# Patient Record
Sex: MALE | Race: WHITE | NOT HISPANIC OR LATINO | Employment: FULL TIME | URBAN - METROPOLITAN AREA
[De-identification: names, ages, dates, MRNs, and addresses within clinical notes are randomized per-mention and may not be internally consistent; named-entity substitution may affect disease eponyms.]

---

## 2019-09-28 ENCOUNTER — APPOINTMENT (EMERGENCY)
Dept: RADIOLOGY | Facility: HOSPITAL | Age: 24
End: 2019-09-28
Attending: EMERGENCY MEDICINE
Payer: COMMERCIAL

## 2019-09-28 ENCOUNTER — APPOINTMENT (EMERGENCY)
Dept: RADIOLOGY | Facility: HOSPITAL | Age: 24
End: 2019-09-28
Payer: COMMERCIAL

## 2019-09-28 ENCOUNTER — HOSPITAL ENCOUNTER (EMERGENCY)
Facility: HOSPITAL | Age: 24
Discharge: HOME | End: 2019-09-28
Attending: EMERGENCY MEDICINE | Admitting: EMERGENCY MEDICINE
Payer: COMMERCIAL

## 2019-09-28 VITALS
SYSTOLIC BLOOD PRESSURE: 143 MMHG | RESPIRATION RATE: 18 BRPM | DIASTOLIC BLOOD PRESSURE: 62 MMHG | OXYGEN SATURATION: 96 % | HEART RATE: 120 BPM | TEMPERATURE: 97.7 F | WEIGHT: 185 LBS

## 2019-09-28 DIAGNOSIS — S83.106A: Primary | ICD-10-CM

## 2019-09-28 LAB
ALBUMIN SERPL BCP-MCNC: 4.6 G/DL (ref 3.5–5)
ALP SERPL-CCNC: 74 U/L (ref 46–116)
ALT SERPL W P-5'-P-CCNC: 44 U/L (ref 12–78)
ANION GAP SERPL CALCULATED.3IONS-SCNC: 17 MMOL/L (ref 4–13)
AST SERPL W P-5'-P-CCNC: 28 U/L (ref 5–45)
BASOPHILS # BLD AUTO: 0.02 THOUSANDS/ΜL (ref 0–0.1)
BASOPHILS NFR BLD AUTO: 0 % (ref 0–1)
BILIRUB SERPL-MCNC: 0.2 MG/DL (ref 0.2–1)
BUN SERPL-MCNC: 16 MG/DL (ref 5–25)
CALCIUM SERPL-MCNC: 8.7 MG/DL (ref 8.3–10.1)
CHLORIDE SERPL-SCNC: 99 MMOL/L (ref 100–108)
CO2 SERPL-SCNC: 23 MMOL/L (ref 21–32)
CREAT SERPL-MCNC: 1.28 MG/DL (ref 0.6–1.3)
EOSINOPHIL # BLD AUTO: 0.04 THOUSAND/ΜL (ref 0–0.61)
EOSINOPHIL NFR BLD AUTO: 0 % (ref 0–6)
ERYTHROCYTE [DISTWIDTH] IN BLOOD BY AUTOMATED COUNT: 11.3 % (ref 11.6–15.1)
GFR SERPL CREATININE-BSD FRML MDRD: 79 ML/MIN/1.73SQ M
GLUCOSE SERPL-MCNC: 112 MG/DL (ref 65–140)
HCT VFR BLD AUTO: 42.1 % (ref 36.5–49.3)
HGB BLD-MCNC: 15 G/DL (ref 12–17)
IMM GRANULOCYTES # BLD AUTO: 0.03 THOUSAND/UL (ref 0–0.2)
IMM GRANULOCYTES NFR BLD AUTO: 0 % (ref 0–2)
LYMPHOCYTES # BLD AUTO: 3.25 THOUSANDS/ΜL (ref 0.6–4.47)
LYMPHOCYTES NFR BLD AUTO: 32 % (ref 14–44)
MCH RBC QN AUTO: 32.8 PG (ref 26.8–34.3)
MCHC RBC AUTO-ENTMCNC: 35.6 G/DL (ref 31.4–37.4)
MCV RBC AUTO: 92 FL (ref 82–98)
MONOCYTES # BLD AUTO: 0.7 THOUSAND/ΜL (ref 0.17–1.22)
MONOCYTES NFR BLD AUTO: 7 % (ref 4–12)
NEUTROPHILS # BLD AUTO: 6.08 THOUSANDS/ΜL (ref 1.85–7.62)
NEUTS SEG NFR BLD AUTO: 61 % (ref 43–75)
NRBC BLD AUTO-RTO: 0 /100 WBCS
PLATELET # BLD AUTO: 260 THOUSANDS/UL (ref 149–390)
PMV BLD AUTO: 8.2 FL (ref 8.9–12.7)
POTASSIUM SERPL-SCNC: 3 MMOL/L (ref 3.5–5.3)
PROT SERPL-MCNC: 8 G/DL (ref 6.4–8.2)
RBC # BLD AUTO: 4.58 MILLION/UL (ref 3.88–5.62)
SODIUM SERPL-SCNC: 139 MMOL/L (ref 136–145)
WBC # BLD AUTO: 10.12 THOUSAND/UL (ref 4.31–10.16)

## 2019-09-28 PROCEDURE — 96375 TX/PRO/DX INJ NEW DRUG ADDON: CPT

## 2019-09-28 PROCEDURE — 36415 COLL VENOUS BLD VENIPUNCTURE: CPT | Performed by: EMERGENCY MEDICINE

## 2019-09-28 PROCEDURE — 96376 TX/PRO/DX INJ SAME DRUG ADON: CPT

## 2019-09-28 PROCEDURE — 99285 EMERGENCY DEPT VISIT HI MDM: CPT

## 2019-09-28 PROCEDURE — 85025 COMPLETE CBC W/AUTO DIFF WBC: CPT | Performed by: EMERGENCY MEDICINE

## 2019-09-28 PROCEDURE — 73560 X-RAY EXAM OF KNEE 1 OR 2: CPT

## 2019-09-28 PROCEDURE — 99243 OFF/OP CNSLTJ NEW/EST LOW 30: CPT | Performed by: ORTHOPAEDIC SURGERY

## 2019-09-28 PROCEDURE — 96374 THER/PROPH/DIAG INJ IV PUSH: CPT

## 2019-09-28 PROCEDURE — 80053 COMPREHEN METABOLIC PANEL: CPT | Performed by: EMERGENCY MEDICINE

## 2019-09-28 PROCEDURE — 73706 CT ANGIO LWR EXTR W/O&W/DYE: CPT

## 2019-09-28 RX ORDER — HYDROCODONE BITARTRATE AND ACETAMINOPHEN 5; 325 MG/1; MG/1
1 TABLET ORAL ONCE
Status: COMPLETED | OUTPATIENT
Start: 2019-09-28 | End: 2019-09-28

## 2019-09-28 RX ORDER — PROPOFOL 10 MG/ML
60 INJECTION, EMULSION INTRAVENOUS ONCE
Status: COMPLETED | OUTPATIENT
Start: 2019-09-28 | End: 2019-09-28

## 2019-09-28 RX ORDER — KETAMINE HCL IN NACL, ISO-OSM 100MG/10ML
1 SYRINGE (ML) INJECTION ONCE
Status: COMPLETED | OUTPATIENT
Start: 2019-09-28 | End: 2019-09-28

## 2019-09-28 RX ORDER — HYDROCODONE BITARTRATE AND ACETAMINOPHEN 5; 325 MG/1; MG/1
1 TABLET ORAL EVERY 6 HOURS PRN
Qty: 15 TABLET | Refills: 0 | Status: SHIPPED | OUTPATIENT
Start: 2019-09-28 | End: 2019-10-01

## 2019-09-28 RX ORDER — PROPOFOL 10 MG/ML
50 INJECTION, EMULSION INTRAVENOUS ONCE
Status: DISCONTINUED | OUTPATIENT
Start: 2019-09-28 | End: 2019-09-28

## 2019-09-28 RX ORDER — FENTANYL CITRATE 50 UG/ML
100 INJECTION, SOLUTION INTRAMUSCULAR; INTRAVENOUS ONCE
Status: COMPLETED | OUTPATIENT
Start: 2019-09-28 | End: 2019-09-28

## 2019-09-28 RX ORDER — PROPOFOL 10 MG/ML
INJECTION, EMULSION INTRAVENOUS
Status: DISCONTINUED
Start: 2019-09-28 | End: 2019-09-28 | Stop reason: WASHOUT

## 2019-09-28 RX ORDER — PROPOFOL 10 MG/ML
150 INJECTION, EMULSION INTRAVENOUS ONCE
Status: COMPLETED | OUTPATIENT
Start: 2019-09-28 | End: 2019-09-28

## 2019-09-28 RX ORDER — NAPROXEN 500 MG/1
500 TABLET ORAL 2 TIMES DAILY WITH MEALS
Qty: 10 TABLET | Refills: 0 | Status: SHIPPED | OUTPATIENT
Start: 2019-09-28 | End: 2019-10-03

## 2019-09-28 RX ORDER — NAPROXEN 500 MG/1
500 TABLET ORAL ONCE
Status: COMPLETED | OUTPATIENT
Start: 2019-09-28 | End: 2019-09-28

## 2019-09-28 RX ADMIN — NAPROXEN 500 MG: 500 TABLET ORAL at 22:37

## 2019-09-28 RX ADMIN — PROPOFOL 60 MG: 10 INJECTION, EMULSION INTRAVENOUS at 20:04

## 2019-09-28 RX ADMIN — IOHEXOL 120 ML: 350 INJECTION, SOLUTION INTRAVENOUS at 19:58

## 2019-09-28 RX ADMIN — HYDROCODONE BITARTRATE AND ACETAMINOPHEN 1 TABLET: 5; 325 TABLET ORAL at 22:37

## 2019-09-28 RX ADMIN — PROPOFOL 60 MG: 10 INJECTION, EMULSION INTRAVENOUS at 19:59

## 2019-09-28 RX ADMIN — FENTANYL CITRATE 100 MCG: 50 INJECTION, SOLUTION INTRAMUSCULAR; INTRAVENOUS at 19:25

## 2019-09-28 RX ADMIN — PROPOFOL 60 MG: 10 INJECTION, EMULSION INTRAVENOUS at 20:11

## 2019-09-28 RX ADMIN — Medication 84 MG: at 19:59

## 2019-09-28 RX ADMIN — FENTANYL CITRATE 100 MCG: 50 INJECTION INTRAMUSCULAR; INTRAVENOUS at 18:34

## 2019-09-28 NOTE — ED PROVIDER NOTES
History  Chief Complaint   Patient presents with    Knee Injury - Major     pt presents to the ed with possible left knee or patellar dislocation  pt states that he injured his knee while drinking and wrestling tonight  pt states he drank " alot of beer" tonight      81-year-old male presents with left knee injury after wrestling  He is currently intoxicated on alcohol was wrestling when the partner fell on his knee and his knees currently deformed  He is in significant pain  Can feel his knee and toes  Unable to move because of pain  Denies any other injuries or complaints at this time  History provided by:  Patient   used: No        None       History reviewed  No pertinent past medical history  History reviewed  No pertinent surgical history  History reviewed  No pertinent family history  I have reviewed and agree with the history as documented  Social History     Tobacco Use    Smoking status: Never Smoker   Substance Use Topics    Alcohol use: Yes    Drug use: Not on file        Review of Systems   All other systems reviewed and are negative  Physical Exam  Physical Exam   Constitutional: He is oriented to person, place, and time  He appears well-developed and well-nourished  HENT:   Head: Normocephalic and atraumatic  Eyes: Pupils are equal, round, and reactive to light  EOM are normal    Neck: Normal range of motion  Neck supple  Cardiovascular: Normal rate and regular rhythm  Pulmonary/Chest: Effort normal and breath sounds normal    Abdominal: Soft  Bowel sounds are normal    Musculoskeletal: Normal range of motion  Left lower extremity:  Significant deformity noted the left knee joint with lateral dislocation of the knee cap and the knee joint  Positive popliteal pulses noted  Positive DP and PT pulses intact  Peroneal nerve function is intact  No other deformities noted at the hip joint or the ankle joint     Neurological: He is alert and oriented to person, place, and time  Skin: Skin is warm and dry  Psychiatric: He has a normal mood and affect  Nursing note and vitals reviewed        Vital Signs  ED Triage Vitals   Temperature Pulse Respirations Blood Pressure SpO2   09/28/19 1832 09/28/19 1832 09/28/19 1832 09/28/19 1832 09/28/19 1832   97 7 °F (36 5 °C) 96 18 120/59 97 %      Temp Source Heart Rate Source Patient Position - Orthostatic VS BP Location FiO2 (%)   09/28/19 1832 09/28/19 1832 09/28/19 1942 09/28/19 1942 --   Tympanic Monitor Lying Left arm       Pain Score       09/28/19 1834       5           Vitals:    09/28/19 2120 09/28/19 2130 09/28/19 2145 09/28/19 2200   BP: 145/65 158/67 145/63 143/62   Pulse: (!) 114 (!) 124 (!) 120 (!) 120   Patient Position - Orthostatic VS: Lying  Lying Lying         Visual Acuity      ED Medications  Medications   fentanyl citrate (PF) 100 MCG/2ML 100 mcg (100 mcg Intravenous Given 9/28/19 1834)   fentanyl citrate (PF) 100 MCG/2ML 100 mcg (100 mcg Intravenous Given 9/28/19 1925)   Ketamine HCl 84 mg (84 mg Intravenous Given 9/28/19 1959)   propofol (DIPRIVAN) 200 MG/20ML bolus injection 150 mg (60 mg Intravenous Given 9/28/19 1959)   iohexol (OMNIPAQUE) 350 MG/ML injection (MULTI-DOSE) 100 mL (120 mL Intravenous Given 9/28/19 1958)   propofol (DIPRIVAN) 200 MG/20ML bolus injection 60 mg (60 mg Intravenous Given 9/28/19 2004)   propofol (DIPRIVAN) 200 MG/20ML bolus injection 60 mg (60 mg Intravenous Given 9/28/19 2011)   HYDROcodone-acetaminophen (NORCO) 5-325 mg per tablet 1 tablet (1 tablet Oral Given 9/28/19 2237)   naproxen (NAPROSYN) tablet 500 mg (500 mg Oral Given 9/28/19 2237)       Diagnostic Studies  Results Reviewed     Procedure Component Value Units Date/Time    Comprehensive metabolic panel [[id number]]  (Abnormal) Collected:  09/28/19 1833    Lab Status:  Final result Specimen:  Blood from Arm, Left Updated:  09/28/19 1853     Sodium 139 mmol/L      Potassium 3 0 mmol/L      Chloride 99 mmol/L      CO2 23 mmol/L      ANION GAP 17 mmol/L      BUN 16 mg/dL      Creatinine 1 28 mg/dL      Glucose 112 mg/dL      Calcium 8 7 mg/dL      AST 28 U/L      ALT 44 U/L      Alkaline Phosphatase 74 U/L      Total Protein 8 0 g/dL      Albumin 4 6 g/dL      Total Bilirubin 0 20 mg/dL      eGFR 79 ml/min/1 73sq m     Narrative:       National Kidney Disease Foundation guidelines for Chronic Kidney Disease (CKD):     Stage 1 with normal or high GFR (GFR > 90 mL/min/1 73 square meters)    Stage 2 Mild CKD (GFR = 60-89 mL/min/1 73 square meters)    Stage 3A Moderate CKD (GFR = 45-59 mL/min/1 73 square meters)    Stage 3B Moderate CKD (GFR = 30-44 mL/min/1 73 square meters)    Stage 4 Severe CKD (GFR = 15-29 mL/min/1 73 square meters)    Stage 5 End Stage CKD (GFR <15 mL/min/1 73 square meters)  Note: GFR calculation is accurate only with a steady state creatinine    CBC and differential [[id number]]  (Abnormal) Collected:  09/28/19 1833    Lab Status:  Final result Specimen:  Blood from Arm, Left Updated:  09/28/19 1838     WBC 10 12 Thousand/uL      RBC 4 58 Million/uL      Hemoglobin 15 0 g/dL      Hematocrit 42 1 %      MCV 92 fL      MCH 32 8 pg      MCHC 35 6 g/dL      RDW 11 3 %      MPV 8 2 fL      Platelets 647 Thousands/uL      nRBC 0 /100 WBCs      Neutrophils Relative 61 %      Immat GRANS % 0 %      Lymphocytes Relative 32 %      Monocytes Relative 7 %      Eosinophils Relative 0 %      Basophils Relative 0 %      Neutrophils Absolute 6 08 Thousands/µL      Immature Grans Absolute 0 03 Thousand/uL      Lymphocytes Absolute 3 25 Thousands/µL      Monocytes Absolute 0 70 Thousand/µL      Eosinophils Absolute 0 04 Thousand/µL      Basophils Absolute 0 02 Thousands/µL                  XR knee 1 or 2 views left   Final Result by Emily Schuster MD (09/29 1121)      Interval reduction of the previously noted knee dislocation        Persistent large joint effusion            Workstation performed: [id number]         CTA lower extremity left w wo contrast   Final Result by Kathrin Collazo MD (09/28 2034)      1  Lateral dislocation of the femorotibial, and patellofemoral joints  No fracture appreciated  2   No angiographic abnormality         Workstation performed: [id number]         XR knee 1 or 2 vw left   Final Result by Andrew Mcginnis MD (09/29 6341)      Lateral dislocation            Workstation performed: [id number]                    Procedures  Procedural Sedation  Performed by: Xavier Davila DO  Authorized by: Xavier Davila DO     Immediate pre-procedure details:     Reassessment: Patient reassessed immediately prior to procedure      Reviewed: vital signs      Verified: bag valve mask available, emergency equipment available, intubation equipment available, IV patency confirmed, oxygen available and suction available    Procedure details (see MAR for exact dosages):     Preoxygenation:  Nasal cannula    Sedation:  Ketamine    Intra-procedure monitoring:  Blood pressure monitoring, continuous pulse oximetry, frequent vital sign checks and cardiac monitor    Intra-procedure events: none    Post-procedure details:     Post-sedation assessments completed and reviewed: airway patency not reviewed, cardiovascular function not reviewed, hydration status not reviewed, mental status not reviewed, nausea/vomiting not reviewed, pain level not reviewed, respiratory function not reviewed and temperature not reviewed      Patient is stable for discharge or admission: yes      Patient tolerance:   Tolerated well, no immediate complications  Orthopedic injury treatment  Performed by: Xavier Davila DO  Authorized by: Xavier Davila DO     Patient Location:  ED  Verbal consent obtained?: Yes    Consent given by:  Patient  Patient identity confirmed:  Verbally with patient and arm band  Injury location:  Knee  Injury type:  Dislocation  Dislocation type: lateral    Distal perfusion: normal    Neurological function: normal    Range of motion: reduced    Local anesthesia used?: No    General anesthesia used?: No    Manipulation performed?: Yes    Reduction method:  Direct traction  Reduction method:  Direct traction  Reduction method:  Direct traction  Reduction method:  Direct traction  Reduction method:  Direct traction  Reduction method:  Direct traction  Skeletal traction used?: Yes    Reduction successful?: Yes    Confirmation: Reduction confirmed by x-ray    Immobilization:  Knee immobilizer  Distal perfusion: normal    Neurological function: normal    Range of motion: normal    Patient tolerance:  Patient tolerated the procedure well with no immediate complications  CriticalCare Time  Performed by: Alona Flannery DO  Authorized by: Alona Flannery DO     Critical care provider statement:     Critical care was necessary to treat or prevent imminent or life-threatening deterioration of the following conditions:  Trauma    Critical care was time spent personally by me on the following activities:  Blood draw for specimens, obtaining history from patient or surrogate, development of treatment plan with patient or surrogate, discussions with consultants, evaluation of patient's response to treatment, examination of patient, ordering and review of radiographic studies, ordering and review of laboratory studies, re-evaluation of patient's condition, review of old charts and ordering and performing treatments and interventions    I assumed direction of critical care for this patient from another provider in my specialty: no        Conscious Sedation Assessment      Classification Score   ASA Scale Assessment  1-Healthy patient, no disease outside surgical process filed at 09/28/2019 1956           ED Course                               MDM  Number of Diagnoses or Management Options  Knee dislocation:   Diagnosis management comments: Patient immediately upon arrival had a full physical exam confirming neurovascular status intact at bedside by me  Patient got a CTA of the left lower extremity which was unremarkable  X-ray at bedside showed a lateral knee dislocation  I spoke to Dr Janey Montero who recommended reduction of the knee  So under conscious sedation with ketamine and propofol I applied traction and reduced the knee  Post reduction knee x-ray showed successful reduction  Dr Janey Montero came to the ED spoke to the patient and his parents  Patient lives in [facility] and will follow up with the orthopedist there  He was placed in a knee mobilizer given crutches with strict nonweightbearing instructions and pain medications  He was also given the CDs of his x-rays for follow-up  Patient and parents verbalized understanding of instructions had no further questions the time of discharge  He remained neurologically intact hemodynamically stable during his ED course and was well-appearing at the time of discharge  2 ABIs done initially upon arrival and an hour after reduction of the knee that was dislocated both the she showed an KVNG index of more than 0 9  Amount and/or Complexity of Data Reviewed  Clinical lab tests: ordered and reviewed  Tests in the radiology section of CPT®: ordered and reviewed  Tests in the medicine section of CPT®: ordered and reviewed    Patient Progress  Patient progress: stable      Disposition  Final diagnoses:   Knee dislocation     Time reflects when diagnosis was documented in both MDM as applicable and the Disposition within this note     Time User Action Codes Description Comment    9/28/2019 10:21 PM Liss Gallagher Add [H02 191H] Knee dislocation       ED Disposition     ED Disposition Condition Date/Time Comment    Discharge Stable Sat Sep 28, 2019 10:20 PM Angel Gilliam discharge to home/self care              Follow-up Information     Follow up With Specialties Details Why Contact Info Additional Information    [address] [facility] Emergency Medicine  If symptoms worsen [address] [facility]  [phone] [facility], [place], [place], [address]          There are no discharge medications for this patient  No discharge procedures on file      ED Provider  Electronically Signed by           Xavier Davila DO  09/29/19 8984

## 2019-09-29 NOTE — CONSULTS
Consultation - Orthopedics   Nacho Baker 26 y o  male MRN: [id number]  Unit/Bed#: ED 06 Encounter: [id number]      Assessment/Plan     Assessment:  1) status post closed reduction of traumatic lateral left knee dislocation    Plan:  Patient was seen examined at bedside after successful closed reduction of the patient's traumatic lateral left knee dislocation by the emergency department attending  Patient was aroused in from procedural sedation and on examination still appears slightly intoxicated  On examination all of his compartments are soft in the thigh and lower extremity I do not of concern for compartment syndrome at this time  There is some swelling around the medial aspect of his knee  His posterior tibial and dorsalis pedis pulses 2+ and bounding  As per the emergency department attending his ABIs are 0 93 on examination and his CTA is negative for vascular injury per the radiologist report  His x-rays demonstrate no fracture on post reduction films  I do not appreciate an osseous injury on the limited imaging of the knee on CTA  He will likely need delayed ligament reconstruction for underlying ligament damage from his traumatic injury this evening  Patient is from the [facility] where he works and I encouraged him to seek consultation with an orthopedic surgeon upon his arrival back to [address] [place] [address] on Monday  Events of this evening and his care was also discussed with his father at bedside via telephone  The patient demonstrates understanding as well as his friends who were at bedside and not intoxicated and had been taking notes for the encounter      Nonweightbearing left lower extremity  Knee immobilizer at all times  Crutches  Analgesia p r n   I discussed with the emergency department attending that they should perform another round of neurovascular checks and another round of ABIs in 1 hour from the initial time of my evaluation if they remain above 0 9 the patient may be discharged safely however if they are below 0 9 vascular surgery should be consulted  History of Present Illness   Physician Requesting Consult: Fidel Artis DO  Reason for Consult / Principal Problem:  Left knee pain  HPI: Talisha Duran is a 26y o  year old male who presents with left knee pain and deformity  The patient was at and along the event for [facility] and he was consuming alcohol today and ultimately this evening was wrestling with another person in the backyard when his opponent landed on his left lower extremity causing significant pain and deformity  Patient was transferred to the hospital for evaluation  The patient was found to have a traumatic lateral left knee dislocation  Patient denies any paresthesias in left lower extremity upon arrival as per the the emergency department attending  Upon arrival the patient did report significant knee pain around the left knee  Orthopedics was consulted emergently and upon reviewing initial films recommended that the emergency department attempted closed reduction of the left knee dislocation emergently due to high risk of vascular and neurologic compromise  Upon Orthopedics arrival the knee successfully reduced and the patient was comfortable in a knee immobilizer  Patient does admit to being at an alumni party today consuming approximately 10 cocktails today  Consults    Review of Systems   Constitutional: Positive for activity change  Negative for fever and unexpected weight change  Odor of alcohol on breath   HENT: Negative  Eyes: Positive for redness  Injected sclera   Respiratory: Negative  Cardiovascular: Negative  Gastrointestinal: Negative  Genitourinary: Negative  Musculoskeletal: Positive for arthralgias, gait problem and joint swelling  See HPI   Skin: Negative  Neurological: Negative for dizziness, weakness, numbness and headaches  Historical Information   History reviewed  No pertinent past medical history  History reviewed  No pertinent surgical history  Social History   Social History     Substance and Sexual Activity   Alcohol Use Yes     Social History     Substance and Sexual Activity   Drug Use Not on file     Social History     Tobacco Use   Smoking Status Never Smoker     Family History: non-contributory    Meds/Allergies   all current active meds have been reviewed  No Known Allergies    Objective   Vitals: Blood pressure 163/78, pulse (!) 131, temperature 97 7 °F (36 5 °C), temperature source Tympanic, resp  rate 22, weight 83 9 kg (185 lb), SpO2 98 %  ,There is no height or weight on file to calculate BMI  No intake or output data in the 24 hours ending 09/28/19 2104  No intake/output data recorded  Invasive Devices     Peripheral Intravenous Line            Peripheral IV 09/28/19 Right Forearm less than 1 day                Physical Exam   Constitutional: He is oriented to person, place, and time  He appears well-developed and well-nourished  Odor of alcohol on breath   HENT:   Head: Normocephalic and atraumatic  Eyes: Conjunctivae and EOM are normal    Neck: Normal range of motion  Cardiovascular: Normal rate  Pulmonary/Chest: Effort normal    Musculoskeletal:        Left knee: He exhibits no effusion  See orthopedic exam   Neurological: He is alert and oriented to person, place, and time  Skin: Skin is warm and dry  Capillary refill takes less than 2 seconds  Left Knee Exam     Tenderness   The patient is experiencing tenderness in the lateral joint line, medial joint line and patella  Other   Erythema: absent  Scars: absent  Sensation: normal  Swelling: mild  Effusion: no effusion present    Comments:  Gross examination there is no other sign of acute trauma in his other extremities  LLE:  Post reduction exam:  On examination the knee immobilizer was opened and the skin was examined  There is no dimple sign    There is no dimple sign, the skin is diffusely intact, there is swelling over the medial aspect of the knee  All compartments of the thigh and lower extremity are completely soft compressible  The patient has 2+ dorsalis pedis and posterior tibialis pulse  + plantar flexion/dorsiflexion/EHL, sensation intact to light touch all dermatomes of the left lower extremity  Imaging Studies: I have personally reviewed pertinent films in PACS injury films of the left knee reviewed by me demonstrate a lateral dislocation of the left knee  I do not appreciate a fracture on injury films  Post reduction x-rays of the left knee demonstrate concentric reduction of the left knee without any fracture present  There is evidence of soft tissue swelling  CTA of the left lower extremity performed prior to reduction demonstrates no fracture of the distal femur proximal tibia  There is no fracture of the patella on limited reconstructions  As per the radiologist report there is no evidence of vascular damage  Code Status: No Order  Advance Directive and Living Will:      Power of :    POLST:      Sina SEPULVEDA    Division of Adult Reconstruction  Department of Orthopaedic Surgery  [facility]

## 2019-09-29 NOTE — ED NOTES
Meds given to pt per Dr Winston Cross to take at home  Pt verbalizes understanding         Rebekah Trivedi RN  09/28/19 4270